# Patient Record
Sex: FEMALE | Race: WHITE
[De-identification: names, ages, dates, MRNs, and addresses within clinical notes are randomized per-mention and may not be internally consistent; named-entity substitution may affect disease eponyms.]

---

## 2017-10-05 ENCOUNTER — HOSPITAL ENCOUNTER (INPATIENT)
Dept: HOSPITAL 74 - YASAS | Age: 52
LOS: 4 days | Discharge: LEFT BEFORE DISCHARGE | DRG: 770 | End: 2017-10-09
Attending: PSYCHIATRY & NEUROLOGY | Admitting: PSYCHIATRY & NEUROLOGY
Payer: COMMERCIAL

## 2017-10-05 VITALS — BODY MASS INDEX: 30.1 KG/M2

## 2017-10-05 DIAGNOSIS — Z87.42: ICD-10-CM

## 2017-10-05 DIAGNOSIS — E11.9: ICD-10-CM

## 2017-10-05 DIAGNOSIS — F20.9: ICD-10-CM

## 2017-10-05 DIAGNOSIS — R00.0: ICD-10-CM

## 2017-10-05 DIAGNOSIS — F14.20: Primary | ICD-10-CM

## 2017-10-05 DIAGNOSIS — F17.210: ICD-10-CM

## 2017-10-05 LAB
APPEARANCE UR: (no result)
BILIRUB UR STRIP.AUTO-MCNC: NEGATIVE MG/DL
COLOR UR: YELLOW
KETONES UR QL STRIP: NEGATIVE
NITRITE UR QL STRIP: NEGATIVE
PH UR: 5 [PH] (ref 5–8)
PROT UR QL STRIP: NEGATIVE
PROT UR QL STRIP: NEGATIVE
RBC # UR STRIP: NEGATIVE /UL
SP GR UR: 1.02 (ref 1–1.02)
UROBILINOGEN UR STRIP-MCNC: NEGATIVE MG/DL (ref 0.2–1)

## 2017-10-05 PROCEDURE — G0009 ADMIN PNEUMOCOCCAL VACCINE: HCPCS

## 2017-10-05 PROCEDURE — HZ42ZZZ GROUP COUNSELING FOR SUBSTANCE ABUSE TREATMENT, COGNITIVE-BEHAVIORAL: ICD-10-PCS | Performed by: PSYCHIATRY & NEUROLOGY

## 2017-10-05 PROCEDURE — G0008 ADMIN INFLUENZA VIRUS VAC: HCPCS

## 2017-10-05 RX ADMIN — Medication SCH: at 23:06

## 2017-10-05 NOTE — HP
Admission ROS [facility]


Chief Complaint: 


SEEKING REHAB SERVICES 


Allergies/Adverse Reactions: 


 Allergies











Allergy/AdvReac Type Severity Reaction Status Date / Time


 


Penicillins Allergy Severe  Verified 10/05/17 20:05











History of Present Illness: 


52 Y.O. WOMAN WITH AN EXTENSIVE HISTORY OF CRACK-COCAINE DEPENDENCE IS HERE 

SEEKING REHAB SERVICES.  HER LAST REHAB ADMISSION WAS 4 YEARS AGO AT A 

DIFFERENT FACILITY.  LONGEST PERIOD CLEAN HAS BEEN 5 YEARS BUT REPORTS 

RELAPSING 4 MONTHS AGO. 


Exam Limitations: No Limitations





- Ebola screening


Have you traveled outside of the country in the last 21 days: No


Have you had contact with anyone from an Ebola affected area: No


Have you been sick,other than usual withdrawal symptoms: No


Do you have a fever: No





- Review of Systems


Constitutional: Loss of Appetite, Changes in sleep


EENT: reports: Tearing


Respiratory: reports: Cough


Cardiac: reports: No Symptoms Reported


GI: reports: No Symptoms Reported


: reports: No Symptoms Reported


Musculoskeletal: reports: Joint Pain (RIGHT KNEE PAIN)


Integumentary: reports: No Symptoms Reported


Neuro: reports: No Symptoms reported


Endocrine: reports: No Symptoms Reported


Hematology: reports: No Symptoms Reported


Psychiatric: reports: Judgement Intact, Mood/Affect Appropiate, Orientated x3


Other Systems: Reviewed and Negative





Patient History





- Patient Medical History


Hx Anemia: No


Hx Asthma: No


Hx Chronic Obstructive Pulmonary Disease (COPD): No


Hx Cancer: No


Hx Congestive Heart Failure: No


Hx Hypertension: No


Hx Hypercholesterolemia: No


Hx Pacemaker: No


HX Cerebrovascular Accident: No


Hx Seizures: No


Hx Dementia: No


Hx Diabetes: Yes (TYPE II )


Hx Gastrointestinal Disorders: No


Hx Liver Disease: No


Hx Genitourinary Disorders: No


Hx Sexually Transmitted Disorders: Yes (SYPHILIS-TREATED 4 MONTHS GO )


Hx Renal Disease (ESRD): No


Hx Thyroid Disease: No


Hx Human Immunodeficiency Virus (HIV): No


Hx Hepatitis C: No


Hx Depression: No


Hx Suicide Attempt: No


Hx Bipolar Disorder: Yes


Hx Schizophrenia: Yes





- Patient Surgical History


Past Surgical History: Yes


Hx Orthopedic Surgery: Yes (RIGHT HAND FX REPAIR   )


Hx Hysterectomy: Yes (AT AGE 38)


Anesthesia Reaction: No





- PPD History


Documented Results: Positive w/o proof


Results: NEEDS CXR 


PPD to be Administered?: No





- Reproductive History


Patient is a Female of Child Bearing Age (11 -55 yrs old): Yes


LMP comment: AT AGE 38


Patient Pregnant: No





- Smoking Cessation


Smoking history: Current every day smoker


Have you smoked in the past 12 months: Yes


Initiated information on smoking cessation: Yes


'Breaking Loose' booklet given: 10/05/17





- Substance & Tx. History


Hx Alcohol Use: No


Hx Substance Use: Yes


Substance Use Type: Cocaine


Hx Substance Use Treatment: Yes (REHAB: 4 YEARS AGO )





- Substances Abused


  ** Crack


Route: Smoking


Frequency: Daily


Amount used: $150


Age of first use: 19


Date of Last Use: 10/04/17





Family Disease History





- Family Disease History


Family Disease History: Diabetes: Grandparent





Admission Physical Exam [facility]





- Vital Signs


Vital Signs: 


 Vital Signs - 24 hr











  10/05/17





  19:33


 


Temperature 97.7 F


 


Pulse Rate 110 H


 


Respiratory 16





Rate 


 


Blood Pressure 125/70














- Physical


General Appearance: Yes: No Apparent Distress, Nourished, Appropriately Dressed


HEENTM: Yes: Hearing grossly Normal, Normocephalic, Normal Voice


Respiratory: Yes: Chest Non-Tender, Lungs Clear, Normal Breath Sounds, No 

Respiratory Distress, No Accessory Muscle Use


Neck: Yes: No masses,lesions,Nodules, Trachea in good position


Breast: Yes: Breast Exam Deferred


Cardiology: Yes: Regular Rhythm, Tachycardia


Abdominal: Yes: Non Tender, Flat, Soft


Genitourinary: Yes: Other (NOM COMPLAINTS REPORTED)


Back: Yes: Normal Inspection


Musculoskeletal: Yes: full range of Motion, Gait Steady, Pelvis Stable


Extremities: Yes: Normal Capillary Refill, Normal Inspection, Normal Range of 

Motion


Neurological: Yes: Fully Oriented, Alert, Normal Mood/Affect, Normal Response


Integumentary: Yes: Normal Color, Dry, Warm


Lymphatic: Yes: Within Normal Limits





- Diagnostic


(1) Cocaine dependence, uncomplicated


Current Visit: Yes   Status: Chronic





(2) Diabetes mellitus, type 2


Current Visit: Yes   Status: Chronic   





(3) Nicotine dependence


Current Visit: Yes   Status: Chronic   








Cleared for Admission [facility]





- Detox or Rehab


[facility] Level of Care: Observation Bed


Claeared for Rehab Admission: Yes





[facility] Breath Alcohol Content


Breath Alcohol Content: 0





Urine Pregancy Test





- Result


Urine Pregnancy Test Results: Negative- NO Line Present





Urine Drug Screen





- Results


Drug Screen Negative: No


Urine Drug Screen Results: HORTENSIA-Cocaine





Inpatient Rehab Admission





- Initial Determination


Are CD services needed?: Yes


Free of communicable disease: Yes


Not in need of hospitalization: Yes





- Rehab Admission Criteria


Previous failed treatment: Yes


Poor recovery environment: Yes


Comorbidities: Yes


Lacks judgement: No


Patient is meeting Inpatient Rehab admission criteria:: Yes

## 2017-10-06 LAB
ALBUMIN SERPL-MCNC: 3.2 G/DL (ref 3.4–5)
ALP SERPL-CCNC: 88 U/L (ref 45–117)
ALT SERPL-CCNC: 22 U/L (ref 12–78)
ANION GAP SERPL CALC-SCNC: 7 MMOL/L (ref 8–16)
AST SERPL-CCNC: 13 U/L (ref 15–37)
BILIRUB SERPL-MCNC: 0.2 MG/DL (ref 0.2–1)
CALCIUM SERPL-MCNC: 8.6 MG/DL (ref 8.5–10.1)
CO2 SERPL-SCNC: 25 MMOL/L (ref 21–32)
CREAT SERPL-MCNC: 0.7 MG/DL (ref 0.55–1.02)
DEPRECATED RDW RBC AUTO: 14.3 % (ref 11.6–15.6)
GLUCOSE SERPL-MCNC: 230 MG/DL (ref 74–106)
HIV 1 & 2 AB: NEGATIVE
HIV 1 AGP24: NEGATIVE
MCH RBC QN AUTO: 28.2 PG (ref 25.7–33.7)
MCHC RBC AUTO-ENTMCNC: 32.4 G/DL (ref 32–36)
MCV RBC: 87 FL (ref 80–96)
PLATELET # BLD AUTO: 197 K/MM3 (ref 134–434)
PMV BLD: 8.9 FL (ref 7.5–11.1)
PROT SERPL-MCNC: 6.4 G/DL (ref 6.4–8.2)
WBC # BLD AUTO: 7.6 K/MM3 (ref 4–10)

## 2017-10-06 RX ADMIN — HALOPERIDOL SCH MG: 5 TABLET ORAL at 21:12

## 2017-10-06 RX ADMIN — INSULIN ASPART SCH UNITS: 100 INJECTION, SOLUTION INTRAVENOUS; SUBCUTANEOUS at 17:13

## 2017-10-06 RX ADMIN — INSULIN ASPART SCH UNITS: 100 INJECTION, SOLUTION INTRAVENOUS; SUBCUTANEOUS at 07:34

## 2017-10-06 RX ADMIN — NICOTINE SCH MG: 21 PATCH TRANSDERMAL at 09:51

## 2017-10-06 RX ADMIN — METFORMIN HYDROCHLORIDE SCH MG: 500 TABLET ORAL at 06:50

## 2017-10-06 RX ADMIN — Medication SCH TAB: at 09:51

## 2017-10-06 RX ADMIN — Medication SCH MG: at 21:12

## 2017-10-06 RX ADMIN — METFORMIN HYDROCHLORIDE SCH MG: 500 TABLET ORAL at 17:12

## 2017-10-06 NOTE — HP
Psychiatrist Admission





- Data


Date of interview: 10/06/17


Admission source: [facility]


Identifying data: This is the first admission to [address] [place] [address] for this 52 years old H female single,resides in [place] 

housing,on SSI.


Medical History: Significant for DM,H/O Hysterectomy,Cholecystectomy.


Psychiatric History: First contact with psychiatrist was at 15 years old when 

she was admitted to [facility] due to psychotic episode(auditory 

halluciantions,strange behavior).patient was dx with Schizophrenia.Patient was 

placed on Haldol.Patient reports 13-15 more psychiatric admissions.Most recent 

recent was 4 years ago to [facility] due to severe depression,hearing 

voices,drug use.patient sees psychiatrist at [facility] in 

the [place].Medications: Haldol 5 mg po bid.


Physical/Sexual Abuse/Trauma History: denies


Vital Signs: 


 Vital Signs - 24 hr











  10/05/17 10/05/17 10/06/17





  19:33 22:24 00:38


 


Temperature 97.7 F 98.2 F 


 


Pulse Rate 110 H 71 


 


Respiratory 16 20 18





Rate   


 


Blood Pressure 125/70 128/79 














  10/06/17 10/06/17





  03:18 06:59


 


Temperature  98.2 F


 


Pulse Rate  63


 


Respiratory 18 18





Rate  


 


Blood Pressure  139/82











Allergies/Adverse Reactions: 


 Allergies











Allergy/AdvReac Type Severity Reaction Status Date / Time


 


Penicillins Allergy Severe  Verified 10/05/17 20:05











Date of last physical exam: 10/05/17


Concur with the findings of this exam: Yes





- Substance Abuse/Tx History


Hx Alcohol Use: No (socially on/off)


Hx Substance Use: Yes (crack/cocaine since 20 yo,longest 3 yeaRS OF SOBRIETY,

RELAPSED 5 MONTHS AGO)


Substance Use Type: Cocaine


Hx Substance Use Treatment: Yes (completed long term inpatient treatment 2 yo)





Mental Status Exam





- Mental Status Exam


Alert and Oriented to: Time, Place, Person


Cognitive Function: Grossly Intact


Patient Appearance: Unkempt


Mood: Sad


Affect: Mood Congruent, Labile


Patient Behavior: Cooperative


Speech Pattern: Clear


Voice Loudness: Normal


Thought Process: Goal Oriented


Thought Disorder: Being Controlled


Hallucinations: Denies


Suicidal Ideation: Denies


Homicidal Ideation: Denies


Insight/Judgement: Fair


Sleep: Fair


Appetite: Fair


Muscle strength/Tone: Normal


Gait/Station: Normal





Psychiatric Findings





- Problem List (Axis 1, 2,3)


(1) Diabetes mellitus, type 2


Current Visit: Yes   Status: Chronic   





(2) Nicotine dependence


Current Visit: Yes   Status: Chronic   





(3) Cocaine dependence


Current Visit: Yes   Status: Chronic   





(4) Schizophrenia


Current Visit: Yes   Status: Chronic   








- Initial Treatment Plan


Initial Treatment Plan: Haldol 5 mg po bid.Will monitor progress.

## 2017-10-06 NOTE — EKG
Test Reason : 

Blood Pressure : ***/*** mmHG

Vent. Rate : 073 BPM     Atrial Rate : 073 BPM

   P-R Int : 132 ms          QRS Dur : 080 ms

    QT Int : 400 ms       P-R-T Axes : 023 086 068 degrees

   QTc Int : 440 ms

 

NORMAL SINUS RHYTHM

NORMAL ECG

NO PREVIOUS ECGS AVAILABLE

Confirmed by COBY OZUNA MD ([id number]) on 10/6/2017 9:50:17 AM

 

Referred By:             Confirmed By:COBY OZUNA MD

## 2017-10-07 RX ADMIN — Medication SCH TAB: at 09:33

## 2017-10-07 RX ADMIN — INSULIN ASPART SCH UNITS: 100 INJECTION, SOLUTION INTRAVENOUS; SUBCUTANEOUS at 06:40

## 2017-10-07 RX ADMIN — HALOPERIDOL SCH: 5 TABLET ORAL at 21:14

## 2017-10-07 RX ADMIN — NICOTINE POLACRILEX PRN MG: 2 GUM, CHEWING ORAL at 15:05

## 2017-10-07 RX ADMIN — NICOTINE POLACRILEX PRN MG: 2 GUM, CHEWING ORAL at 08:56

## 2017-10-07 RX ADMIN — INSULIN ASPART SCH UNITS: 100 INJECTION, SOLUTION INTRAVENOUS; SUBCUTANEOUS at 16:32

## 2017-10-07 RX ADMIN — NICOTINE POLACRILEX PRN MG: 2 GUM, CHEWING ORAL at 12:51

## 2017-10-07 RX ADMIN — Medication SCH: at 21:14

## 2017-10-07 RX ADMIN — METFORMIN HYDROCHLORIDE SCH MG: 500 TABLET ORAL at 06:39

## 2017-10-07 RX ADMIN — NICOTINE SCH MG: 21 PATCH TRANSDERMAL at 09:33

## 2017-10-07 RX ADMIN — HYDROXYZINE PAMOATE PRN MG: 50 CAPSULE ORAL at 15:04

## 2017-10-07 RX ADMIN — METFORMIN HYDROCHLORIDE SCH MG: 500 TABLET ORAL at 16:31

## 2017-10-07 RX ADMIN — HALOPERIDOL SCH MG: 5 TABLET ORAL at 09:32

## 2017-10-08 RX ADMIN — Medication SCH TAB: at 09:11

## 2017-10-08 RX ADMIN — NICOTINE POLACRILEX PRN MG: 2 GUM, CHEWING ORAL at 09:12

## 2017-10-08 RX ADMIN — HYDROXYZINE PAMOATE PRN MG: 50 CAPSULE ORAL at 17:56

## 2017-10-08 RX ADMIN — HYDROXYZINE PAMOATE PRN MG: 50 CAPSULE ORAL at 09:11

## 2017-10-08 RX ADMIN — HALOPERIDOL SCH MG: 5 TABLET ORAL at 09:11

## 2017-10-08 RX ADMIN — METFORMIN HYDROCHLORIDE SCH MG: 500 TABLET ORAL at 17:03

## 2017-10-08 RX ADMIN — Medication SCH MG: at 21:06

## 2017-10-08 RX ADMIN — NICOTINE SCH MG: 21 PATCH TRANSDERMAL at 09:12

## 2017-10-08 RX ADMIN — INSULIN ASPART SCH UNITS: 100 INJECTION, SOLUTION INTRAVENOUS; SUBCUTANEOUS at 17:03

## 2017-10-08 RX ADMIN — INSULIN ASPART SCH UNITS: 100 INJECTION, SOLUTION INTRAVENOUS; SUBCUTANEOUS at 07:01

## 2017-10-08 RX ADMIN — METFORMIN HYDROCHLORIDE SCH MG: 500 TABLET ORAL at 07:01

## 2017-10-08 RX ADMIN — HALOPERIDOL SCH MG: 5 TABLET ORAL at 21:06

## 2017-10-09 VITALS — HEART RATE: 66 BPM | SYSTOLIC BLOOD PRESSURE: 153 MMHG | TEMPERATURE: 98.1 F | DIASTOLIC BLOOD PRESSURE: 84 MMHG

## 2017-10-09 RX ADMIN — HALOPERIDOL SCH MG: 5 TABLET ORAL at 10:11

## 2017-10-09 RX ADMIN — METFORMIN HYDROCHLORIDE SCH MG: 500 TABLET ORAL at 06:26

## 2017-10-09 RX ADMIN — Medication SCH TAB: at 10:11

## 2017-10-09 RX ADMIN — NICOTINE SCH MG: 21 PATCH TRANSDERMAL at 10:11

## 2017-10-09 RX ADMIN — HYDROXYZINE PAMOATE PRN MG: 50 CAPSULE ORAL at 10:12

## 2017-10-09 RX ADMIN — INSULIN ASPART SCH UNITS: 100 INJECTION, SOLUTION INTRAVENOUS; SUBCUTANEOUS at 06:26

## 2017-10-09 RX ADMIN — METFORMIN HYDROCHLORIDE SCH: 500 TABLET ORAL at 16:52

## 2017-10-09 RX ADMIN — INSULIN ASPART SCH: 100 INJECTION, SOLUTION INTRAVENOUS; SUBCUTANEOUS at 16:52

## 2017-10-09 NOTE — PN
Psychiatric Progress Note


Vital Signs: 


 Vital Signs











 Period  Temp  Pulse  Resp  BP Sys/Melgar  Pulse Ox


 


 Last 24 Hr  98.1 F  66  16-18  153/84  











Date of Session: 10/09/17


Chief Complaint:: Discharge visit


HPI: Patient addressed Cocaine dependence comorbid with Schizophrenia.


ROS: Significant for DM.


Current Medications: 


Active Medications











Generic Name Dose Route Start Last Admin





  Trade Name Freq  PRN Reason Stop Dose Admin


 


Acetaminophen  650 mg 10/05/17 20:42 10/06/17 21:13





  Tylenol -  PO   650 mg





  Q4H PRN   Administration





  PAIN   


 


Al Hydroxide/Mg Hydroxide  30 ml 10/05/17 20:42  





  Mylanta Oral Suspension -  PO   





  Q6H PRN   





  DYSPEPSIA   


 


Diphenhydramine HCl  50 mg 10/05/17 20:42 10/08/17 21:07





  Benadryl -  PO   50 mg





  HSMR1 PRN   Administration





  INSOMNIA   


 


Eucalyptus/Menthol/Phenol/Sorbitol  1 each 10/05/17 20:42  





  Cepastat Lozenge -  MM   





  Q4H PRN   





  SORE THROAT   


 


Guaifenesin  10 ml 10/05/17 20:42  





  Robitussin Dm -  PO   





  Q6H PRN   





  COUGH   


 


Haloperidol  5 mg 10/06/17 22:00 10/09/17 10:11





  Haldol -  PO   5 mg





  BID BRONSON   Administration


 


Hydroxyzine Pamoate  50 mg 10/05/17 20:42 10/09/17 10:12





  Vistaril -  PO   50 mg





  Q4H PRN   Administration





  AGITATION   


 


Ibuprofen  400 mg 10/05/17 20:42 10/07/17 09:33





  Motrin -  PO   400 mg





  Q6H PRN   Administration





  SEVERE PAIN   


 


Insulin Aspart  1 vial 10/06/17 07:00 10/09/17 06:26





  Novolog Vial Sliding Scale -  SQ   2 units





  BIDAC BRONSON   Administration





  Protocol   


 


Loperamide HCl  4 mg 10/05/17 20:42  





  Imodium -  PO   





  Q6H PRN   





  DIARRHEA   


 


Magnesium Citrate  300 ml 10/05/17 20:42  





  Citroma -  PO   





  Q48H PRN   





  CONSTIPATION   


 


Magnesium Hydroxide  30 ml 10/05/17 20:42  





  Milk Of Magnesia -  PO   





  DAILY PRN   





  CONSTIPATION   


 


Metformin HCl  500 mg 10/06/17 07:00 10/09/17 06:26





  Glucophage -  PO   500 mg





  BID@0700,1630 BRONSON   Administration


 


Nicotine  21 mg 10/06/17 10:00 10/09/17 10:11





  Nicoderm Patch -  TD   21 mg





  DAILY BRONSON   Administration


 


Nicotine Polacrilex  2 mg 10/05/17 20:42 10/08/17 09:12





  Nicorette Gum -  BUC   2 mg





  Q2H PRN   Administration





  NICOTINE REPLACEMENT RX   


 


Prenatal Multivit/Folic Acid/Iron  1 tab 10/06/17 10:00 10/09/17 10:11





  Prenatal Vitamins ([facility]) -  PO   1 tab





  DAILY BRONSON   Administration


 


Pseudoephedrine/Triprolidine  1 combo 10/05/17 20:42  





  Actifed -  PO   





  TID PRN   





  NASAL CONGESTION   


 


Thiamine HCl  100 mg 10/05/17 22:00 10/08/17 21:06





  Vitamin B1 -  PO   100 mg





  HS BRONSON   Administration











Current Side Effect: No


Lab tests ordered: No


Lab tests reviewed: Yes


Provider note:: Patient decided to sign out today AMA with no serios reasons 

for discharge.Patient didnt meet her treatment goals .She decided to continue 

to address her issues on outpatient basis despite our strong recommendations to 

continue her stabilization in inpatient  rehabilitation.Patient reports finding 

that  Haldol 5 mg po bid help to cope with psychosis.Scripts for 30 days 

provided.  Supportive therapy privided focusing on coping skills,support 

utilization to maintain recovery.


Total face to face time:: 35





Mental Status Exam





- Mental Status Exam


Alert and Oriented to: Time, Place, Person


Cognitive Function: Grossly Intact


Patient Appearance: Unkempt


Mood: Euthymic


Affect: Mood Congruent


Patient Behavior: Resitive to Care


Speech Pattern: Clear


Voice Loudness: Normal


Thought Process: Goal Oriented


Thought Disorder: Being Controlled


Hallucinations: Denies


Suicidal Ideation: Denies


Homicidal Ideation: Denies


Insight/Judgement: Poor


Sleep: Fair


Appetite: Good


Muscle strength/Tone: Normal


Gait/Station: Normal





Psychiatric Treatment Plan





- Problem List


(1) Diabetes mellitus, type 2


Current Visit: Yes   





(2) Nicotine dependence


Current Visit: Yes   





(3) Cocaine dependence


Current Visit: Yes   





(4) Schizophrenia


Current Visit: Yes

## 2018-10-04 ENCOUNTER — HOSPITAL ENCOUNTER (INPATIENT)
Dept: HOSPITAL 74 - YASAS | Age: 53
LOS: 1 days | Discharge: LEFT BEFORE DISCHARGE | DRG: 770 | End: 2018-10-05
Attending: PSYCHIATRY & NEUROLOGY | Admitting: PSYCHIATRY & NEUROLOGY
Payer: COMMERCIAL

## 2018-10-04 VITALS — BODY MASS INDEX: 28 KG/M2

## 2018-10-04 DIAGNOSIS — E11.9: ICD-10-CM

## 2018-10-04 DIAGNOSIS — Z88.0: ICD-10-CM

## 2018-10-04 DIAGNOSIS — F32.9: ICD-10-CM

## 2018-10-04 DIAGNOSIS — Z79.84: ICD-10-CM

## 2018-10-04 DIAGNOSIS — Z79.4: ICD-10-CM

## 2018-10-04 DIAGNOSIS — F41.9: ICD-10-CM

## 2018-10-04 DIAGNOSIS — F14.20: Primary | ICD-10-CM

## 2018-10-04 DIAGNOSIS — F17.210: ICD-10-CM

## 2018-10-04 PROCEDURE — HZ42ZZZ GROUP COUNSELING FOR SUBSTANCE ABUSE TREATMENT, COGNITIVE-BEHAVIORAL: ICD-10-PCS | Performed by: PSYCHIATRY & NEUROLOGY

## 2018-10-04 NOTE — HP
Admission ROS [facility]


Chief Complaint: 





Seeking admission to Rehab


Allergies/Adverse Reactions: 


 Allergies











Allergy/AdvReac Type Severity Reaction Status Date / Time


 


Penicillins Allergy Severe  Verified 10/05/17 20:05











History of Present Illness: 





53 years old female with a long history of cocaine dependence is seeking 

admission to [facility]. Patient has been in previous Rehabilitation and Diabetes Type 

2, depression and anxiety. He denies suicide attempt and suicdal ideation at 

this time.


Exam Limitations: No Limitations





- Ebola screening


Have you traveled outside of the country in the last 21 days: No (N)


Have you had contact with anyone from an Ebola affected area: No


Have you been sick,other than usual withdrawal symptoms: No


Do you have a fever: No





- Review of Systems


Constitutional: No Symptoms Reported


EENT: reports: No Symptoms Reported


Respiratory: reports: No Symptoms reported


Cardiac: reports: No Symptoms Reported


GI: reports: No Symptoms Reported


: reports: No Symptoms Reported


Musculoskeletal: reports: No Symptoms Reported


Integumentary: reports: No Symptoms Reported


Neuro: reports: No Symptoms reported


Endocrine: reports: No Symptoms Reported


Hematology: reports: No Symptoms Reported


Psychiatric: reports: No Sypmtoms Reported


Other Systems: Reviewed and Negative





Patient History





- Patient Medical History


Hx Anemia: No


Hx Asthma: No


Hx Chronic Obstructive Pulmonary Disease (COPD): No


Hx Cancer: No


Hx Cardiac Disorders: No


Hx Congestive Heart Failure: No


Hx Hypertension: No


Hx Hypercholesterolemia: No


Hx Pacemaker: No


HX Cerebrovascular Accident: No


Hx Seizures: No


Hx Dementia: No


Hx Diabetes: Yes (Metformin and Insulin)


Hx Gastrointestinal Disorders: No


Hx Liver Disease: No


Hx Genitourinary Disorders: No


Hx Sexually Transmitted Disorders: No


Hx Renal Disease (ESRD): No


Hx Thyroid Disease: No


Hx Human Immunodeficiency Virus (HIV): No (Negative 2018)


Hx Hepatitis C: No


Hx Depression: Yes (Not on medication)


Hx Suicide Attempt: No (Denies suicide attempt and suicidal ideation at this 

time)


Hx Bipolar Disorder: Yes


Hx Schizophrenia: Yes


Other Medical History: Anxiety - Not on medication





- Patient Surgical History


Past Surgical History: Yes


Hx Cholecystectomy: Yes


Hx Orthopedic Surgery: Yes (RIGHT HAND FX REPAIR   )


Hx Hysterectomy: Yes (AT AGE 38)


Anesthesia Reaction: No





- PPD History


Previous Implant?: Yes (PPD POSITVE. INHfor 6 months)


Documented Results: Positive w/proof


Implanted On Prior [facility] Admission?: No


Results: NEEDS CXR 


PPD to be Administered?: No





- Reproductive History


Patient is a Female of Child Bearing Age (11 -55 yrs old): No (Male)





- Smoking Cessation


Smoking history: Current every day smoker


Have you smoked in the past 12 months: Yes


Aproximately how many cigarettes per day: 20


Hx Chewing Tobacco Use: No


Initiated information on smoking cessation: Yes


'Breaking Loose' booklet given: 10/04/18





- Substance & Tx. History


Hx Alcohol Use: No


Hx Substance Use: Yes


Substance Use Type: Cocaine


Hx Substance Use Treatment: Yes ([facility])





Family Disease History





- Family Disease History


Family Disease History: Diabetes: Grandparent





Admission Physical Exam [facility]





- Vital Signs


Vital Signs: 


 Vital Signs - 24 hr











  10/04/18





  21:46


 


Temperature 98.0 F


 


Pulse Rate 67


 


Respiratory 18





Rate 


 


Blood Pressure 152/72














- Physical


General Appearance: Yes: Within Normal Limits, Appropriately Dressed


HEENTM: Yes: Within Normal Limits, EOMI, Normal ENT Inspection, Normocephalic, 

Normal Voice


Respiratory: Yes: Within Normal Limits


Neck: Yes: Within Normal Limits


Breast: Yes: Breast Exam Deferred


Cardiology: Yes: Within Normal Limits


Abdominal: Yes: Within Normal Limits


Genitourinary: Yes: Within Normal Limits


Back: Yes: Within Normal Limits


Musculoskeletal: Yes: Within Normal Limits


Extremities: Yes: Within Normal Limits


Neurological: Yes: Within Normal Limits, CNs II-XII NML intact, Alert, Normal 

Mood/Affect


Integumentary: Yes: Within Normal Limits


Lymphatic: Yes: Within Normal Limits





- Diagnostic


(1) Depression


Current Visit: Yes   Status: Chronic   


Qualifiers: 


   Depression Type: unspecified   Qualified Code(s): F32.9 - Major depressive 

disorder, single episode, unspecified   





(2) Anxiety


Current Visit: Yes   Status: Chronic   





(3) Cocaine dependence, uncomplicated


Current Visit: Yes   Status: Chronic   





(4) Diabetes mellitus, type 2


Current Visit: Yes   Status: Chronic   





(5) Nicotine dependence


Current Visit: Yes   Status: Chronic   


Qualifiers: 


   Nicotine product type: cigarettes 





Cleared for Admission [facility]





- Detox or Rehab


[facility] Level of Care: Observation Bed


Claeared for Rehab Admission: Yes





[facility] Breath Alcohol Content


Breath Alcohol Content: 0





Urine Pregancy Test





- Result


Urine Pregnancy Test Results: Negative- NO Line Present





Urine Drug Screen





- Results


Drug Screen Negative: No


Urine Drug Screen Results: HORTENSIA-Cocaine





Inpatient Rehab Admission





- Initial Determination


Are CD services needed?: Yes


Free of communicable disease: Yes


Not in need of hospitalization: Yes





- Rehab Admission Criteria


Previous failed treatment: Yes


Poor recovery environment: Yes


Comorbidities: Yes


Lacks judgement: No


Patient is meeting Inpatient Rehab admission criteria:: Yes

## 2018-10-05 VITALS — DIASTOLIC BLOOD PRESSURE: 84 MMHG | HEART RATE: 69 BPM | SYSTOLIC BLOOD PRESSURE: 164 MMHG

## 2018-10-05 VITALS — TEMPERATURE: 97.8 F

## 2018-10-05 LAB
ALBUMIN SERPL-MCNC: 3.6 G/DL (ref 3.4–5)
ALP SERPL-CCNC: 81 U/L (ref 45–117)
ALT SERPL-CCNC: 18 U/L (ref 13–61)
ANION GAP SERPL CALC-SCNC: 6 MMOL/L (ref 8–16)
APPEARANCE UR: CLEAR
AST SERPL-CCNC: 15 U/L (ref 15–37)
BILIRUB SERPL-MCNC: 0.1 MG/DL (ref 0.2–1)
BILIRUB UR STRIP.AUTO-MCNC: NEGATIVE MG/DL
BUN SERPL-MCNC: 10 MG/DL (ref 7–18)
CALCIUM SERPL-MCNC: 9.6 MG/DL (ref 8.5–10.1)
CHLORIDE SERPL-SCNC: 109 MMOL/L (ref 98–107)
CO2 SERPL-SCNC: 27 MMOL/L (ref 21–32)
COLOR UR: (no result)
CREAT SERPL-MCNC: 0.5 MG/DL (ref 0.55–1.3)
DEPRECATED RDW RBC AUTO: 14.7 % (ref 11.6–15.6)
GLUCOSE SERPL-MCNC: 115 MG/DL (ref 74–106)
HCT VFR BLD CALC: 40 % (ref 32.4–45.2)
HGB BLD-MCNC: 12.6 GM/DL (ref 10.7–15.3)
KETONES UR QL STRIP: NEGATIVE
LEUKOCYTE ESTERASE UR QL STRIP.AUTO: NEGATIVE
MCH RBC QN AUTO: 27.9 PG (ref 25.7–33.7)
MCHC RBC AUTO-ENTMCNC: 31.6 G/DL (ref 32–36)
MCV RBC: 88.2 FL (ref 80–96)
NITRITE UR QL STRIP: NEGATIVE
PH UR: 6 [PH] (ref 5–8)
PLATELET # BLD AUTO: 237 K/MM3 (ref 134–434)
PMV BLD: 8.8 FL (ref 7.5–11.1)
POTASSIUM SERPLBLD-SCNC: 4.3 MMOL/L (ref 3.5–5.1)
PROT SERPL-MCNC: 7.2 G/DL (ref 6.4–8.2)
PROT UR QL STRIP: NEGATIVE
PROT UR QL STRIP: NEGATIVE
RBC # BLD AUTO: 4.54 M/MM3 (ref 3.6–5.2)
SODIUM SERPL-SCNC: 142 MMOL/L (ref 136–145)
SP GR UR: 1.01 (ref 1–1.03)
UROBILINOGEN UR STRIP-MCNC: NEGATIVE MG/DL (ref 0.2–1)
WBC # BLD AUTO: 7.2 K/MM3 (ref 4–10)

## 2018-10-05 NOTE — EKG
Test Reason : 

Blood Pressure : ***/*** mmHG

Vent. Rate : 073 BPM     Atrial Rate : 073 BPM

   P-R Int : 126 ms          QRS Dur : 076 ms

    QT Int : 394 ms       P-R-T Axes : 037 -24 003 degrees

   QTc Int : 434 ms

 

NORMAL SINUS RHYTHM

NONSPECIFIC ST ABNORMALITY

Confirmed by COBY OZUNA MD ([id number]) on 10/5/2018 9:36:02 AM

 

Referred By:             Confirmed By:COBY OZUNA MD

## 2018-10-05 NOTE — PN
[facility] Progress Note


Note: 





Called by nursing staff to order medication for patient. Haldol 5 mg po BID 

ordered

## 2019-04-05 ENCOUNTER — EMERGENCY (EMERGENCY)
Facility: HOSPITAL | Age: 54
LOS: 1 days | Discharge: HOME | End: 2019-04-05
Attending: EMERGENCY MEDICINE | Admitting: EMERGENCY MEDICINE
Payer: SELF-PAY

## 2019-04-05 VITALS
HEART RATE: 99 BPM | OXYGEN SATURATION: 97 % | TEMPERATURE: 98 F | SYSTOLIC BLOOD PRESSURE: 139 MMHG | RESPIRATION RATE: 18 BRPM | DIASTOLIC BLOOD PRESSURE: 84 MMHG

## 2019-04-05 DIAGNOSIS — R53.83 OTHER FATIGUE: ICD-10-CM

## 2019-04-05 DIAGNOSIS — F14.10 COCAINE ABUSE, UNCOMPLICATED: ICD-10-CM

## 2019-04-05 PROCEDURE — 99284 EMERGENCY DEPT VISIT MOD MDM: CPT

## 2019-04-05 NOTE — ED PROVIDER NOTE - CONSTITUTIONAL, MLM
normal... Well appearing, well nourished, awake, alert, oriented to person, place, time/situation and in no apparent distress. Slightly disheveled.

## 2019-04-05 NOTE — ED ADULT NURSE NOTE - NSIMPLEMENTINTERV_GEN_ALL_ED
Implemented All Universal Safety Interventions:  [place] to call system. Call bell, personal items and telephone within reach. Instruct patient to call for assistance. Room bathroom lighting operational. Non-slip footwear when patient is off stretcher. Physically safe environment: no spills, clutter or unnecessary equipment. Stretcher in lowest position, wheels locked, appropriate side rails in place.

## 2019-04-05 NOTE — ED PROVIDER NOTE - NSFOLLOWUPINSTRUCTIONS_ED_ALL_ED_FT
Please get help for alcohol abuse if you drink heavily or use drugs on a regular basis.     [facility] is a good referral line for crisis and substance abuse help.  [facility] has drop in programs all over the city.    Return to the ER for Emergencies.

## 2019-04-05 NOTE — ED PROVIDER NOTE - OBJECTIVE STATEMENT
53 F presenting with tiredness after crack binge yesterday. Wants to rest. Also says that she can go rest at her shelter on [address] [place]. No acute medical complaints.